# Patient Record
Sex: MALE | Race: BLACK OR AFRICAN AMERICAN | NOT HISPANIC OR LATINO | Employment: FULL TIME | URBAN - METROPOLITAN AREA
[De-identification: names, ages, dates, MRNs, and addresses within clinical notes are randomized per-mention and may not be internally consistent; named-entity substitution may affect disease eponyms.]

---

## 2021-03-24 ENCOUNTER — HOSPITAL ENCOUNTER (EMERGENCY)
Facility: HOSPITAL | Age: 21
Discharge: HOME | End: 2021-03-24
Attending: EMERGENCY MEDICINE
Payer: COMMERCIAL

## 2021-03-24 VITALS
HEIGHT: 74 IN | TEMPERATURE: 99 F | BODY MASS INDEX: 33.01 KG/M2 | SYSTOLIC BLOOD PRESSURE: 142 MMHG | HEART RATE: 80 BPM | RESPIRATION RATE: 20 BRPM | OXYGEN SATURATION: 99 % | DIASTOLIC BLOOD PRESSURE: 73 MMHG | WEIGHT: 257.19 LBS

## 2021-03-24 DIAGNOSIS — R07.9 CHEST PAIN: ICD-10-CM

## 2021-03-24 DIAGNOSIS — R07.89 FEELING OF CHEST TIGHTNESS: Primary | ICD-10-CM

## 2021-03-24 PROCEDURE — 93005 ELECTROCARDIOGRAM TRACING: CPT

## 2021-03-24 PROCEDURE — 96372 THER/PROPH/DIAG INJ SC/IM: CPT

## 2021-03-24 PROCEDURE — 93010 EKG 12-LEAD: ICD-10-PCS | Mod: ,,, | Performed by: INTERNAL MEDICINE

## 2021-03-24 PROCEDURE — 99284 EMERGENCY DEPT VISIT MOD MDM: CPT | Mod: 25

## 2021-03-24 PROCEDURE — 93010 ELECTROCARDIOGRAM REPORT: CPT | Mod: ,,, | Performed by: INTERNAL MEDICINE

## 2021-03-24 PROCEDURE — 63600175 PHARM REV CODE 636 W HCPCS: Performed by: NURSE PRACTITIONER

## 2021-03-24 RX ORDER — KETOROLAC TROMETHAMINE 30 MG/ML
60 INJECTION, SOLUTION INTRAMUSCULAR; INTRAVENOUS
Status: COMPLETED | OUTPATIENT
Start: 2021-03-24 | End: 2021-03-24

## 2021-03-24 RX ORDER — FAMOTIDINE 20 MG/1
20 TABLET, FILM COATED ORAL 2 TIMES DAILY
Qty: 20 TABLET | Refills: 0 | Status: SHIPPED | OUTPATIENT
Start: 2021-03-24 | End: 2022-03-24

## 2021-03-24 RX ORDER — SUCRALFATE 1 G/10ML
1 SUSPENSION ORAL 4 TIMES DAILY
Qty: 414 ML | Refills: 0 | Status: SHIPPED | OUTPATIENT
Start: 2021-03-24

## 2021-03-24 RX ADMIN — KETOROLAC TROMETHAMINE 60 MG: 30 INJECTION, SOLUTION INTRAMUSCULAR at 12:03

## 2021-07-01 ENCOUNTER — PATIENT MESSAGE (OUTPATIENT)
Dept: ADMINISTRATIVE | Facility: OTHER | Age: 21
End: 2021-07-01

## 2022-12-23 ENCOUNTER — HOSPITAL ENCOUNTER (EMERGENCY)
Facility: HOSPITAL | Age: 22
Discharge: HOME | End: 2022-12-23
Attending: EMERGENCY MEDICINE
Payer: MEDICAID

## 2022-12-23 VITALS
DIASTOLIC BLOOD PRESSURE: 70 MMHG | HEART RATE: 74 BPM | TEMPERATURE: 98 F | WEIGHT: 229.75 LBS | BODY MASS INDEX: 29.49 KG/M2 | OXYGEN SATURATION: 99 % | RESPIRATION RATE: 16 BRPM | SYSTOLIC BLOOD PRESSURE: 150 MMHG

## 2022-12-23 DIAGNOSIS — S59.901A ELBOW INJURY, RIGHT, INITIAL ENCOUNTER: Primary | ICD-10-CM

## 2022-12-23 PROCEDURE — 99283 EMERGENCY DEPT VISIT LOW MDM: CPT

## 2022-12-23 RX ORDER — IBUPROFEN 800 MG/1
800 TABLET ORAL EVERY 6 HOURS PRN
Qty: 20 TABLET | Refills: 0 | Status: SHIPPED | OUTPATIENT
Start: 2022-12-23

## 2022-12-23 NOTE — ED PROVIDER NOTES
Encounter Date: 12/23/2022       History     Chief Complaint   Patient presents with    Arm Injury     Patient states he hit his elbow about a week ago and it has been sore since. Patient states the pain shoots from his elbow into his upper arm. Pt states it is hard to fully extend his arm.      22-year-old male presents emergency department with complaints of right elbow pain.  Patient reports hitting it on his seat buccal in his car.  Patient states that he is unable to fully flex his arm.  Patient denies any other injuries or symptoms.    The history is provided by the patient.   Arm Injury   The incident occurred several days ago. Incident location: car. The injury mechanism was a direct blow. There is an injury to the Right elbow. Pertinent negatives include no chest pain, no nausea and no weakness.   Review of patient's allergies indicates:  No Known Allergies  No past medical history on file.  No past surgical history on file.  No family history on file.     Review of Systems   Constitutional:  Negative for fever.   HENT:  Negative for sore throat.    Respiratory:  Negative for shortness of breath.    Cardiovascular:  Negative for chest pain.   Gastrointestinal:  Negative for nausea.   Genitourinary:  Negative for dysuria.   Musculoskeletal:  Positive for arthralgias. Negative for back pain.   Skin:  Negative for rash.   Neurological:  Negative for weakness.   Hematological:  Does not bruise/bleed easily.   All other systems reviewed and are negative.    Physical Exam     Initial Vitals [12/23/22 1155]   BP Pulse Resp Temp SpO2   (!) 150/70 74 16 98.3 °F (36.8 °C) 99 %      MAP       --         Physical Exam    Constitutional: He appears well-developed and well-nourished. No distress.   HENT:   Head: Normocephalic and atraumatic.   Nose: Nose normal.   Mouth/Throat: Uvula is midline and oropharynx is clear and moist.   Eyes: Conjunctivae and EOM are normal. Pupils are equal, round, and reactive to light.    Neck: Neck supple.   Normal range of motion.  Cardiovascular:  Normal rate and regular rhythm.           Pulmonary/Chest: Effort normal and breath sounds normal. No respiratory distress. He has no decreased breath sounds. He has no wheezes. He has no rales.   Abdominal: Abdomen is soft. Bowel sounds are normal. There is no abdominal tenderness.   Musculoskeletal:         General: Normal range of motion.      Right elbow: Normal range of motion. Tenderness present in medial epicondyle.      Cervical back: Normal range of motion and neck supple.     Neurological: He is alert and oriented to person, place, and time. He has normal strength. GCS eye subscore is 4. GCS verbal subscore is 5. GCS motor subscore is 6.   Skin: Skin is warm and dry. Capillary refill takes less than 2 seconds. No rash noted.   Psychiatric: He has a normal mood and affect. His speech is normal and behavior is normal.       ED Course   Procedures  Labs Reviewed - No data to display       Imaging Results              X-Ray Elbow Complete Right (Final result)  Result time 12/23/22 13:38:43      Final result by Orville Prado MD (12/23/22 13:38:43)                   Impression:      1.  Negative for acute fracture or dislocation.      Electronically signed by: Orville Prado  Date:    12/23/2022  Time:    13:38               Narrative:    EXAMINATION:  XR ELBOW COMPLETE 3 VIEW RIGHT    CLINICAL HISTORY:  Unspecified injury of right elbow, initial encounter    COMPARISON:  None    FINDINGS:  No fracture or dislocation of the elbow identified.  Joint spaces are well preserved.  Normal anterior fat pad. Subcutaneous soft tissue edema medial aspect of the distal arm and elbow.  Otherwise soft tissues are normal.                                       Medications - No data to display               I discussed with patient and/or family/caretaker that evaluation in the ED does not suggest any emergent or life threatening medical conditions  requiring immediate intervention beyond what was provided in the ED, and I believe patient is safe for discharge.  Regardless, an unremarkable evaluation in the ED does not preclude the development or presence of a serious of life threatening condition. As such, patient was instructed to return immediately for any worsening or change in current symptoms.              Clinical Impression:   Final diagnoses:  [I69.672F] Elbow injury, right, initial encounter (Primary)        ED Disposition Condition    Discharge Stable          ED Prescriptions       Medication Sig Dispense Start Date End Date Auth. Provider    ibuprofen (ADVIL,MOTRIN) 800 MG tablet Take 1 tablet (800 mg total) by mouth every 6 (six) hours as needed for Pain. 20 tablet 12/23/2022 -- Gildardo Santos Jr., SRINIVAS          Follow-up Information       Follow up With Specialties Details Why Contact Info Additional Information    Paul Lundberg MD Family Medicine In 1 week  [address] [facility]  [place] [address]  [phone]       [facility] Schedule an appointment as soon as possible for a visit   [address] [facility] [address] [phone] Please take [address] for the Medical Office Building. Check in on the first floor.             SRINIVAS Padron Jr.  12/23/22 4504

## 2023-07-07 ENCOUNTER — PATIENT MESSAGE (OUTPATIENT)
Dept: INFECTIOUS DISEASES | Facility: CLINIC | Age: 23
End: 2023-07-07
Payer: COMMERCIAL

## 2024-03-05 ENCOUNTER — TELEPHONE (OUTPATIENT)
Dept: PODIATRY | Facility: CLINIC | Age: 24
End: 2024-03-05
Payer: COMMERCIAL

## 2024-03-13 ENCOUNTER — OFFICE VISIT (OUTPATIENT)
Dept: PODIATRY | Facility: CLINIC | Age: 24
End: 2024-03-13
Payer: COMMERCIAL

## 2024-03-13 ENCOUNTER — PATIENT MESSAGE (OUTPATIENT)
Dept: PODIATRY | Facility: CLINIC | Age: 24
End: 2024-03-13

## 2024-03-13 VITALS — HEIGHT: 74 IN | BODY MASS INDEX: 30.8 KG/M2 | WEIGHT: 240 LBS

## 2024-03-13 DIAGNOSIS — M79.674 GREAT TOE PAIN, RIGHT: ICD-10-CM

## 2024-03-13 DIAGNOSIS — L60.0 INGROWN TOENAIL OF RIGHT FOOT WITH INFECTION: Primary | ICD-10-CM

## 2024-03-13 PROCEDURE — 3008F BODY MASS INDEX DOCD: CPT | Mod: CPTII,S$GLB,, | Performed by: PODIATRIST

## 2024-03-13 PROCEDURE — 99204 OFFICE O/P NEW MOD 45 MIN: CPT | Mod: 25,S$GLB,, | Performed by: PODIATRIST

## 2024-03-13 PROCEDURE — 1159F MED LIST DOCD IN RCRD: CPT | Mod: CPTII,S$GLB,, | Performed by: PODIATRIST

## 2024-03-13 PROCEDURE — 11750 EXCISION NAIL&NAIL MATRIX: CPT | Mod: T5,S$GLB,, | Performed by: PODIATRIST

## 2024-03-13 PROCEDURE — 99999 PR PBB SHADOW E&M-EST. PATIENT-LVL III: CPT | Mod: PBBFAC,,, | Performed by: PODIATRIST

## 2024-03-13 NOTE — LETTER
March 13, 2024      [facility] Podiatry 2nd Floor  [address]  [place] [address]  Phone: [phone]  Fax: [phone]       Patient: Rocky Carrillo   YOB: 2000  Date of Visit: 03/13/2024    To Whom It May Concern:    Junior Carrillo  was at [facility] on 03/13/2024. The patient may return to work on 3/14/24 with no restrictions. If you have any questions or concerns, or if I can be of further assistance, please do not hesitate to contact me.    Sincerely,    Tatyana Hoyos MA

## 2024-03-13 NOTE — PROGRESS NOTES
PODIATRIC MEDICINE AND SURGERY   3/13/2024    Reason for Visit   Chief Complaint   Patient presents with    Ingrown Toenail     C/o poss ingrown toenail, right great toenail, medial border, 0 pain, x several weeks, non-diabetic, wears casual shoes and socks         HPI  This is Rocky Carrillo is a 23 y.o. male who presents today complaining of painful ingrown toenail. Pt states painful toenail for few weeks.  Previous treatment includes oral antibiotics. Pt would like to proceed with nail removal. Patient denies other pedal complaints at this time. Denies any N/V/F/C/SOB/CP.     [facility]  There are no problems to display for this patient.    History reviewed. No pertinent past medical history.    MEDS  Current Outpatient Medications on File Prior to Visit   Medication Sig Dispense Refill    famotidine (PEPCID) 20 MG tablet Take 1 tablet (20 mg total) by mouth 2 (two) times daily. (Patient not taking: Reported on 3/13/2024) 20 tablet 0    ibuprofen (ADVIL,MOTRIN) 800 MG tablet Take 1 tablet (800 mg total) by mouth every 6 (six) hours as needed for Pain. (Patient not taking: Reported on 3/13/2024) 20 tablet 0    sucralfate (CARAFATE) 100 mg/mL suspension Take 10 mLs (1 g total) by mouth 4 (four) times daily. (Patient not taking: Reported on 3/13/2024) 414 mL 0     No current facility-administered medications on file prior to visit.       PSH   History reviewed. No pertinent surgical history.     ALL  Review of patient's allergies indicates:  No Known Allergies    SOC     Social History     Tobacco Use    Smoking status: Never    Smokeless tobacco: Never       Family HX  History reviewed. No pertinent family history.       REVIEW OF SYSTEMS   General: This patient is well-developed, well-nourished and appears stated age, well-oriented to person, place and time, and cooperative and pleasant on today's visit  Constitutional: Negative for chills and fever.   Respiratory: Negative for shortness of breath.   "  Cardiovascular: Negative for chest pain, palpitations, orthopnea  Gastrointestinal: Negative for diarrhea, nausea and vomiting.   Musculoskeletal: Positive for above noted in HPI  Skin: Positive for nail changes   Peripheral Vascular: no claudication or cyanosis  Psychiatric/Behavioral: Negative for altered mental status       Vitals:    03/13/24 1028   Weight: 108.9 kg (240 lb)   Height: 6' 2" (1.88 m)   PainSc: 0-No pain       LOWER EXTREMITY PHYSICAL EXAM    VASCULAR  Dorsalis pedis and posterior tibial pulses palpable 2/4 bilaterally.   Capillary refill time immediate to the toes.   Feet are warm to the touch. Skin temperature warm to warm from proximally to distally   There are no ecchymoses noted to bilateral foot and ankle regions.   There is  edema noted to right hallux.     DERMATOLOGIC  Skin moist with healthy texture and turgor.  Incurvated right  hallux  border with mild erythema and dry drainage.   There are no open ulcerations, lacerations, or fissures to bilateral foot and ankle regions.   There are no hyperkeratotic lesions noted to feet. Nails are well-trimmed.    NEUROLOGIC  Epicritic sensation is intact as the patient is able to sense light touch to bilateral foot and ankle regions.   Achilles and patellar deep tendon reflexes intact  Babinski reflex absent    ORTHOPEDIC/BIOMECHANICAL  Tenderness to palpation border of Right hallux.   Muscle strength AT/EHL/EDL/PT: 5/5; Achilles/Gastroc/Soleus: 5/5; PB/PL: 5/5 Muscle tone is normal.  Ankle joint ROM INTACT DF/PF, non-crepitus    ASSESSMENT  Encounter Diagnoses   Name Primary?    Great toe pain, right     Ingrown toenail of right foot with infection Yes         PLAN    -Discussed presenting problems, etiology, pathologic processes and management options with patient today.   -Patient agrees to proceed with Right Hallux, Partial Nail Avulsion. Risks/benefits/possible complications discussed. Verbal and written consent was signed and witnessed by " LPN.    Procedure : Partial Nail Avulsion, Right Hallux    The hallux was anesthesized with 3 cc of 1% Lidocaine plain. The area was prepped with betadine scrub and then painted with iodine. A sterile tournicot was placed for hemostasis. The affected border of the involved nail bed was freed from the skin edge and nail bed. Attention was then directed to the affected nail border to separate it from the nail bed. The most proximal nail border at the eponychium was released to the area of the matrix. Using an English anvil, a cut was then made to the nail fold in a longitudinal manner at the distal aspect extending to the proximal aspect of the nail plate thereby releasing the most proximal aspect of the nail plate from the nail bed. Using a straight hemostat, the free nail plate segment was clamped and removed. Using a tissue nipper, residual tissue was then removed. The nail groove area was inspected and probed proximally with a curette for any spicules. No spicules were present. The area was then flushed with sterile saline. The tournicot was removed and hyperemia was noted to the digit. The patient tolerated the procedure well. The toe was dressed with antibiotic ointment, gauze, radha and coban.    -Patient received instructions for post op care, pain management with OTC analgesics, and soaking.  -If any signs of infection--increased redness, purulent drainage, increased pain then patient is advised to return to clinic or ER if after clinic hours. Advised to limit activities.  -RTC in 2 weeks for f/u visit, or sooner if any signs of infection noted.      Future Appointments   Date Time Provider Department Center   3/27/2024 10:00 AM Nicole Wesley DPM [facility] POD [place]       Report Electronically Signed By:     Nicole Wesley DPM   Podiatric Medicine & Surgery  [facility]  3/13/2024

## 2024-03-13 NOTE — LETTER
March 13, 2024      [facility] Podiatry 2nd Floor  [address] [facility]  [place] [address]  Phone: [phone]  Fax: [phone]       Patient: Rocky Carrillo   YOB: 2000  Date of Visit: 03/13/2024    To Whom It May Concern:    Junior Carrillo  was at [facility] on 03/13/2024. The patient may return to work on 3/13/24 with no restrictions. If you have any questions or concerns, or if I can be of further assistance, please do not hesitate to contact me.    Sincerely,    Tatyana Hoyos MA

## 2024-03-27 ENCOUNTER — OFFICE VISIT (OUTPATIENT)
Dept: PODIATRY | Facility: CLINIC | Age: 24
End: 2024-03-27
Payer: COMMERCIAL

## 2024-03-27 VITALS — HEIGHT: 74 IN | BODY MASS INDEX: 30.81 KG/M2 | WEIGHT: 240.06 LBS

## 2024-03-27 DIAGNOSIS — L60.0 INGROWN TOENAIL OF RIGHT FOOT WITH INFECTION: Primary | ICD-10-CM

## 2024-03-27 DIAGNOSIS — M79.674 GREAT TOE PAIN, RIGHT: ICD-10-CM

## 2024-03-27 PROCEDURE — 99213 OFFICE O/P EST LOW 20 MIN: CPT | Mod: S$GLB,,, | Performed by: PODIATRIST

## 2024-03-27 PROCEDURE — 99999 PR PBB SHADOW E&M-EST. PATIENT-LVL III: CPT | Mod: PBBFAC,,, | Performed by: PODIATRIST

## 2024-03-27 PROCEDURE — 1159F MED LIST DOCD IN RCRD: CPT | Mod: CPTII,S$GLB,, | Performed by: PODIATRIST

## 2024-03-27 PROCEDURE — 3008F BODY MASS INDEX DOCD: CPT | Mod: CPTII,S$GLB,, | Performed by: PODIATRIST

## 2024-03-27 RX ORDER — NEOMYCIN SULFATE, POLYMYXIN B SULFATE, HYDROCORTISONE 3.5; 10000; 1 MG/ML; [USP'U]/ML; MG/ML
SOLUTION/ DROPS AURICULAR (OTIC)
Qty: 10 ML | Refills: 3 | Status: SHIPPED | OUTPATIENT
Start: 2024-03-27

## 2024-03-27 NOTE — PROGRESS NOTES
"PODIATRY NOTE    S/   Bobbyhaydeedavy Gavino Carrillo is a 23 y.o. male, RTC for follow up status post R hallux partial nail avulsion.  Patient has been soaking and covering the toe as instructed.  Denies any F/C/N/V or pain.  No complaints.      O/  Vitals:    03/27/24 1016   Weight: 108.9 kg (240 lb 1.3 oz)   Height: 6' 2" (1.88 m)   PainSc: 0-No pain       Lower extremity exam:  -Neurovascular status intact.    -Cap refill wnl.    -No edema noted to affected border  -right  hallux nail border clean with minimal dry scab from soaking.    -No erythema or drainage.    -No tenderness to palpation of affected border     A/  1. Onychocryptosis  Note: s/p right hallux partial nail avulsion, no signs of infection    Ingrown toenail of right foot with infection    Great toe pain, right    Other orders  -     neomycin-polymyxin-hydrocortisone (CORTISPORIN) otic solution; Use two drops, twice daily to affected area  Dispense: 10 mL; Refill: 3       P/  -Nail border cleaned. No offending agent or spicule noted.    -Okay to resume activities.  No soaking needed.  -RTC prn    Report Electronically Signed By:     Nicole Wesley DPM   Podiatric Medicine & Surgery  [facility] Blank  3/27/2024                    "